# Patient Record
Sex: MALE | Race: BLACK OR AFRICAN AMERICAN | Employment: PART TIME | URBAN - NONMETROPOLITAN AREA
[De-identification: names, ages, dates, MRNs, and addresses within clinical notes are randomized per-mention and may not be internally consistent; named-entity substitution may affect disease eponyms.]

---
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
## 2020-03-20 ENCOUNTER — HOSPITAL ENCOUNTER (EMERGENCY)
Age: 20
Discharge: HOME | End: 2020-03-20
Payer: MEDICARE

## 2020-03-20 VITALS
HEART RATE: 78 BPM | SYSTOLIC BLOOD PRESSURE: 120 MMHG | WEIGHT: 134 LBS | OXYGEN SATURATION: 98 % | RESPIRATION RATE: 16 BRPM | TEMPERATURE: 97.5 F | DIASTOLIC BLOOD PRESSURE: 74 MMHG

## 2020-03-20 PROCEDURE — 99212 OFFICE O/P EST SF 10 MIN: CPT

## 2020-03-20 PROCEDURE — 99213 OFFICE O/P EST LOW 20 MIN: CPT | Performed by: NURSE PRACTITIONER

## 2020-03-20 RX ORDER — IBUPROFEN 200 MG
200 TABLET ORAL EVERY 6 HOURS PRN
COMMUNITY

## 2020-03-20 ASSESSMENT — ENCOUNTER SYMPTOMS
ABDOMINAL PAIN: 0
EYE ITCHING: 0
TROUBLE SWALLOWING: 0
VOMITING: 1
ALLERGIC/IMMUNOLOGIC NEGATIVE: 1
EYE REDNESS: 0
ANAL BLEEDING: 0
ABDOMINAL DISTENTION: 0
SHORTNESS OF BREATH: 0
RECTAL PAIN: 0
BACK PAIN: 0
WHEEZING: 0
RHINORRHEA: 0
DIARRHEA: 0
BLOOD IN STOOL: 0
NAUSEA: 0
SORE THROAT: 0
STRIDOR: 0
CONSTIPATION: 0
VOICE CHANGE: 0
COUGH: 0

## 2020-03-20 NOTE — ED TRIAGE NOTES
Pt ambulatory into esuc with c/o needing a return to work note. Pt states he was sent home from work yesterday for not feeling good. Pt denies pain. Pt states he thinks it was from something he ate.

## 2020-03-20 NOTE — ED NOTES
All discharge education and information given. Pt instructed to go to ED for any shortness of breath, chest pain or Abd pain. Verbalized Understanding. Left stable.      Randolph Pena RN  03/20/20 1707

## 2020-03-20 NOTE — ED PROVIDER NOTES
Neurological: Negative for dizziness, weakness, light-headedness and headaches. Hematological: Negative for adenopathy. Psychiatric/Behavioral: The patient is not nervous/anxious. PAST MEDICAL HISTORY         Diagnosis Date    Diabetes mellitus [facility])        SURGICAL HISTORY     Patient  has no past surgical history on file. CURRENT MEDICATIONS       Discharge Medication List as of 3/20/2020  1:42 PM      CONTINUE these medications which have NOT CHANGED    Details   ibuprofen (ADVIL;MOTRIN) 200 MG tablet Take 200 mg by mouth every 6 hours as needed for PainHistorical Med      acetaminophen (TYLENOL) 325 MG tablet Take 650 mg by mouth every 6 hours as needed for PainHistorical Med             ALLERGIES     Patient is is allergic to amoxicillin. FAMILY HISTORY     Patient'sfamily history is not on file. SOCIAL HISTORY     Patient  reports that he has never smoked. He has never used smokeless tobacco. He reports that he does not drink alcohol or use drugs. PHYSICAL EXAM     ED TRIAGE VITALS  BP: 120/74, Temp: 97.5 °F (36.4 °C), Heart Rate: 78, Resp: 16, SpO2: 98 %  Physical Exam  Vitals signs and nursing note reviewed. Constitutional:       General: He is not in acute distress. Appearance: Normal appearance. He is well-developed, well-groomed and normal weight. He is not ill-appearing, toxic-appearing or diaphoretic. HENT:      Head: Normocephalic and atraumatic. Right Ear: Hearing, tympanic membrane, ear canal and external ear normal.      Left Ear: Hearing, tympanic membrane, ear canal and external ear normal.      Nose: Nose normal. No congestion or rhinorrhea. Mouth/Throat:      Lips: Pink. No lesions. Mouth: Mucous membranes are moist. No oral lesions. Tongue: No lesions. Palate: No lesions. Pharynx: Oropharynx is clear. Uvula midline. No pharyngeal swelling, oropharyngeal exudate, posterior oropharyngeal erythema or uvula swelling.       Tonsils: No tonsillar exudate or tonsillar abscesses. 2+ on the right. 2+ on the left. Eyes:      General: Lids are normal.         Right eye: No discharge. Left eye: No discharge. Conjunctiva/sclera: Conjunctivae normal.      Right eye: Right conjunctiva is not injected. Left eye: Left conjunctiva is not injected. Pupils: Pupils are equal.   Neck:      Musculoskeletal: Normal range of motion. Cardiovascular:      Rate and Rhythm: Normal rate and regular rhythm. Pulses: Normal pulses. Pulmonary:      Effort: Pulmonary effort is normal. No respiratory distress. Breath sounds: Normal breath sounds and air entry. No stridor, decreased air movement or transmitted upper airway sounds. No decreased breath sounds, wheezing, rhonchi or rales. Abdominal:      General: Abdomen is flat. Bowel sounds are normal.      Palpations: Abdomen is soft. Tenderness: There is no right CVA tenderness, left CVA tenderness, guarding or rebound. Musculoskeletal:      Right knee: He exhibits normal range of motion. Left knee: He exhibits normal range of motion. Lymphadenopathy:      Cervical: No cervical adenopathy. Skin:     General: Skin is warm and dry. Capillary Refill: Capillary refill takes less than 2 seconds. Coloration: Skin is not pale. Findings: No rash. Comments: No obvious signs of infection or trauma. Neurological:      Mental Status: He is alert and oriented to person, place, and time. Sensory: No sensory deficit. Psychiatric:         Behavior: Behavior normal. Behavior is cooperative. DIAGNOSTIC RESULTS   Labs:No results found for this visit on 03/20/20.     IMAGING:  No orders to display     URGENT CARE COURSE:     Vitals:    03/20/20 1329 03/20/20 1331   BP:  120/74   Pulse:  78   Resp:  16   Temp:  97.5 °F (36.4 °C)   TempSrc:  Temporal   SpO2:  98%   Weight: 134 lb (60.8 kg)        Medications - No data to display  PROCEDURES:  Cher Frederick AFl

## 2021-12-31 ENCOUNTER — HOSPITAL ENCOUNTER (EMERGENCY)
Age: 21
Discharge: HOME | End: 2021-12-31
Payer: MEDICARE

## 2021-12-31 VITALS
TEMPERATURE: 97.5 F | WEIGHT: 139 LBS | OXYGEN SATURATION: 97 % | SYSTOLIC BLOOD PRESSURE: 132 MMHG | HEIGHT: 62 IN | BODY MASS INDEX: 25.58 KG/M2 | RESPIRATION RATE: 14 BRPM | DIASTOLIC BLOOD PRESSURE: 68 MMHG | HEART RATE: 90 BPM

## 2021-12-31 DIAGNOSIS — S61.212A LACERATION OF RIGHT MIDDLE FINGER W/O FOREIGN BODY W/O DAMAGE TO NAIL, INITIAL ENCOUNTER: ICD-10-CM

## 2021-12-31 DIAGNOSIS — S61.214A LACERATION OF RIGHT RING FINGER W/O FOREIGN BODY W/O DAMAGE TO NAIL, INITIAL ENCOUNTER: ICD-10-CM

## 2021-12-31 DIAGNOSIS — Z48.02 ENCOUNTER FOR REMOVAL OF SUTURES: Primary | ICD-10-CM

## 2021-12-31 PROCEDURE — 99213 OFFICE O/P EST LOW 20 MIN: CPT

## 2021-12-31 PROCEDURE — 99211 OFF/OP EST MAY X REQ PHY/QHP: CPT

## 2021-12-31 PROCEDURE — 99212 OFFICE O/P EST SF 10 MIN: CPT | Performed by: NURSE PRACTITIONER

## 2021-12-31 NOTE — ED TRIAGE NOTES
Pt ambulatory into esuc with c/o needing sutures removed from right third and fourth digits. Pt states they were placed at [facility] on 12/20/21. Pt states he was suppose to take an antibiotic but states it was not at the [facility]. Pt states he did not call to check. Pt denies pain at this time.

## 2021-12-31 NOTE — ED NOTES
Pt verbalized discharge instructions. Pt informed to go to ER if develop chest pain, shortness of breath or abdominal pain. Pt ambulatory out in stable condition. Assessment unchanged.        Park Victor RN  12/31/21 7745

## 2022-10-04 NOTE — ED PROVIDER NOTES
[place] [address]  Urgent Care Encounter    CHIEF COMPLAINT    Chief Complaint   Patient presents with    Suture / Staple Removal     to third and fourth digits of right hand        Nursing Notes, Past Medical Hx, Past Surgical Hx, Social Hx, Allergies, and Family Hx were all reviewed and agreed with, or any disagreements were addressed in the HPI. HPI    Luann White is a 24 y.o. male who presents for removal of sutures. The sutures were placed 11 days ago. Since that time the patient has noted no signs of infection no increased pain and increased redness swelling or drainage from the wound. He was carrying something and cut his finger on a piece of metal.  Patient has 7 sutures to the ring and middle finger of the right hand. Patient had sutures placed at [place] [id number]    Constitutional:  Denies fever, chills, or weakness   All other pertinent systems negative. PAST MEDICAL HISTORY         Diagnosis Date    Diabetes mellitus [facility])        SURGICAL HISTORY     Patient  has no past surgical history on file. CURRENT MEDICATIONS       Previous Medications    ACETAMINOPHEN (TYLENOL) 325 MG TABLET    Take 650 mg by mouth every 6 hours as needed for Pain    IBUPROFEN (ADVIL;MOTRIN) 200 MG TABLET    Take 200 mg by mouth every 6 hours as needed for Pain       ALLERGIES     Patient is is allergic to amoxicillin. FAMILY HISTORY     Patient's family history is not on file. SOCIAL HISTORY     Patient  reports that he has never smoked. He has never used smokeless tobacco. He reports that he does not drink alcohol and does not use drugs. PHYSICAL EXAM    VITAL SIGNS: /68   Pulse 90   Temp 97.5 °F (36.4 °C) (Temporal)   Resp 14   Ht 5' 2\" (1.575 m)   Wt 139 lb (63 kg)   SpO2 97%   BMI 25.42 kg/m²    Constitutional:  Well developed, Well nourished, No acute distress, Non-toxic appearance. Integument:  Wound appears clean dry and intact. Sutures are in place and there is no sign of surrounding infection or infection within the wound. There is minimal tenderness at this time. PROCEDURES    Suture removal: 7 Sutures were removed and the wound remained closed. Small amount of Dermabond was applied to the middle finger and the wound was left open to air. ED COURSE & MEDICAL DECISION MAKING  Patient presents for suture removal. The wound is well healed without signs of infection. The sutures are removed. Wound care and activity instructions given. Return PRN. FINAL IMPRESSION    1. Encounter for suture removal  2. Laceration of right middle finger initial encounter  3. Laceration of right ring finger initial encounter      DISPOSITION/PLAN   DISPOSITION Decision To Discharge 12/31/2021 09:23:27 AM    Monitor for redness, drainage, pain   Monitor for any fevers  Keep clean and dry  Take medication as directed  Follow up with your PCP or return for any concerns   or go to the Emergency Department      PATIENT REFERRED TO:  [address] [place] [address],[address]  [facility]. [address] [facility] [address]  Schedule an appointment as soon as possible for a visit in 1 week  For recheck and further evaluation, As needed    DISCHARGE MEDICATIONS:  New Prescriptions    No medications on file     Current Discharge Medication List          Alroy Angelucci, [address] [address], APRN - CNP  12/31/21 1649 Continue medications as directed  Monitor BP

## 2023-08-04 ENCOUNTER — HOSPITAL ENCOUNTER (EMERGENCY)
Age: 23
Discharge: HOME | End: 2023-08-04
Payer: MEDICAID

## 2023-08-04 VITALS
SYSTOLIC BLOOD PRESSURE: 126 MMHG | RESPIRATION RATE: 16 BRPM | OXYGEN SATURATION: 96 % | TEMPERATURE: 98.8 F | DIASTOLIC BLOOD PRESSURE: 63 MMHG | WEIGHT: 132 LBS | BODY MASS INDEX: 24.14 KG/M2 | HEART RATE: 111 BPM

## 2023-08-04 DIAGNOSIS — L73.9 FOLLICULITIS: Primary | ICD-10-CM

## 2023-08-04 DIAGNOSIS — L02.411 ABSCESS OF AXILLA, RIGHT: ICD-10-CM

## 2023-08-04 PROCEDURE — 99213 OFFICE O/P EST LOW 20 MIN: CPT | Performed by: EMERGENCY MEDICINE

## 2023-08-04 PROCEDURE — 99213 OFFICE O/P EST LOW 20 MIN: CPT

## 2023-08-04 RX ORDER — CEPHALEXIN 500 MG/1
500 CAPSULE ORAL 3 TIMES DAILY
Qty: 21 CAPSULE | Refills: 0 | Status: SHIPPED | OUTPATIENT
Start: 2023-08-04 | End: 2023-08-11

## 2023-08-04 ASSESSMENT — PAIN - FUNCTIONAL ASSESSMENT: PAIN_FUNCTIONAL_ASSESSMENT: NONE - DENIES PAIN

## 2023-08-04 NOTE — ED TRIAGE NOTES
Patient c/o of lump rt. Armpit x one month. Swollen bump rt. Pubic margaret one wk. Using hot compresses.

## 2023-08-04 NOTE — DISCHARGE INSTRUCTIONS
Keflex as directed until gone    Warm compresses to the axilla and to the pubic area for treatment    You may apply a thin layer of antibiotic ointment to the pubic area    Follow-up with your family physician or return here if no improvement of symptoms in 3 days.   Sooner if worse

## 2023-09-18 ENCOUNTER — HOSPITAL ENCOUNTER (EMERGENCY)
Age: 23
Discharge: HOME | End: 2023-09-18
Payer: MEDICAID

## 2023-09-18 VITALS
SYSTOLIC BLOOD PRESSURE: 137 MMHG | OXYGEN SATURATION: 99 % | HEIGHT: 64 IN | RESPIRATION RATE: 18 BRPM | TEMPERATURE: 98.1 F | BODY MASS INDEX: 22.2 KG/M2 | WEIGHT: 130 LBS | HEART RATE: 85 BPM | DIASTOLIC BLOOD PRESSURE: 73 MMHG

## 2023-09-18 DIAGNOSIS — Z11.3 SCREEN FOR STD (SEXUALLY TRANSMITTED DISEASE): Primary | ICD-10-CM

## 2023-09-18 LAB
BILIRUB UR STRIP.AUTO-MCNC: NEGATIVE MG/DL
CHARACTER UR: CLEAR
COLOR: YELLOW
GLUCOSE UR QL STRIP.AUTO: NEGATIVE MG/DL
KETONES UR QL STRIP.AUTO: NEGATIVE
NITRITE UR QL STRIP.AUTO: NEGATIVE
PH UR STRIP.AUTO: 5.5 [PH] (ref 5–9)
PROT UR STRIP.AUTO-MCNC: NEGATIVE MG/DL
RBC #/AREA URNS HPF: NEGATIVE /[HPF]
SP GR UR STRIP.AUTO: >= 1.03 (ref 1–1.03)
UROBILINOGEN, URINE: 0.2 EU/DL (ref 0.2–1)
WBC #/AREA URNS HPF: NEGATIVE /[HPF]

## 2023-09-18 PROCEDURE — 87491 CHLMYD TRACH DNA AMP PROBE: CPT

## 2023-09-18 PROCEDURE — 87591 N.GONORRHOEAE DNA AMP PROB: CPT

## 2023-09-18 PROCEDURE — 99213 OFFICE O/P EST LOW 20 MIN: CPT

## 2023-09-18 PROCEDURE — 81003 URINALYSIS AUTO W/O SCOPE: CPT

## 2023-09-18 PROCEDURE — 99212 OFFICE O/P EST SF 10 MIN: CPT | Performed by: NURSE PRACTITIONER

## 2023-09-18 ASSESSMENT — ENCOUNTER SYMPTOMS
STRIDOR: 0
COUGH: 0
CHOKING: 0
SHORTNESS OF BREATH: 0
CHEST TIGHTNESS: 0
APNEA: 0
WHEEZING: 0
ABDOMINAL PAIN: 0

## 2023-09-18 ASSESSMENT — PAIN - FUNCTIONAL ASSESSMENT: PAIN_FUNCTIONAL_ASSESSMENT: NONE - DENIES PAIN

## 2023-09-18 NOTE — ED PROVIDER NOTES
[address] [facility]  Urgent Care Encounter      CHIEF COMPLAINT       Chief Complaint   Patient presents with    Dysuria       Nurses Notes reviewed and I agree except as noted in the HPI. HISTORY OFPRESENT ILLNESS   Grace Gil is a 21 y.o. The history is provided by the patient. No  was used. Sexually Transmitted Diseases  This is a new problem. The current episode started 3 to 5 hours ago. The problem occurs rarely. The problem has been resolved. Pertinent negatives include no chest pain, no abdominal pain, no headaches and no shortness of breath. Nothing aggravates the symptoms. Nothing relieves the symptoms. He has tried nothing for the symptoms. The treatment provided no relief. REVIEW OF SYSTEMS     Review of Systems   Constitutional:  Negative for activity change, appetite change, chills, diaphoresis, fatigue, fever and unexpected weight change. Respiratory:  Negative for apnea, cough, choking, chest tightness, shortness of breath, wheezing and stridor. Cardiovascular:  Negative for chest pain, palpitations and leg swelling. Gastrointestinal:  Negative for abdominal pain. Genitourinary:  Positive for dysuria. Negative for decreased urine volume, difficulty urinating, enuresis, flank pain, frequency, genital sores, hematuria, penile discharge, penile pain, penile swelling, scrotal swelling, testicular pain and urgency. Neurological:  Negative for headaches. PAST MEDICAL HISTORY         Diagnosis Date    Diabetes mellitus [facility])        SURGICAL HISTORY     Patient  has no past surgical history on file. CURRENT MEDICATIONS       Discharge Medication List as of 9/18/2023  7:22 PM          ALLERGIES     Patient is is allergic to amoxicillin. FAMILY HISTORY     Patient's family history includes No Known Problems in his father and mother. SOCIAL HISTORY     Patient  reports that he has never smoked.  He has never used smokeless tobacco. He

## 2023-09-19 LAB
CHLAMYDIA TRACHOMATIS BY RT-PCR: NOT DETECTED
CT/NG SOURCE: NORMAL
NEISSERIA GONORRHOEAE BY RT-PCR: NOT DETECTED

## 2024-02-21 ENCOUNTER — APPOINTMENT (OUTPATIENT)
Dept: GENERAL RADIOLOGY | Age: 24
End: 2024-02-21
Payer: MEDICAID

## 2024-02-21 ENCOUNTER — HOSPITAL ENCOUNTER (EMERGENCY)
Age: 24
Discharge: HOME | End: 2024-02-21
Payer: MEDICAID

## 2024-02-21 VITALS
HEART RATE: 74 BPM | BODY MASS INDEX: 22.49 KG/M2 | RESPIRATION RATE: 16 BRPM | WEIGHT: 135 LBS | OXYGEN SATURATION: 100 % | SYSTOLIC BLOOD PRESSURE: 133 MMHG | HEIGHT: 65 IN | TEMPERATURE: 97.9 F | DIASTOLIC BLOOD PRESSURE: 73 MMHG

## 2024-02-21 DIAGNOSIS — M25.572 ACUTE LEFT ANKLE PAIN: Primary | ICD-10-CM

## 2024-02-21 PROCEDURE — 99213 OFFICE O/P EST LOW 20 MIN: CPT

## 2024-02-21 PROCEDURE — 73610 X-RAY EXAM OF ANKLE: CPT

## 2024-02-21 ASSESSMENT — PAIN DESCRIPTION - LOCATION: LOCATION: ANKLE

## 2024-02-21 ASSESSMENT — PAIN DESCRIPTION - DESCRIPTORS: DESCRIPTORS: DISCOMFORT

## 2024-02-21 ASSESSMENT — PAIN - FUNCTIONAL ASSESSMENT
PAIN_FUNCTIONAL_ASSESSMENT: PREVENTS OR INTERFERES SOME ACTIVE ACTIVITIES AND ADLS
PAIN_FUNCTIONAL_ASSESSMENT: 0-10

## 2024-02-21 ASSESSMENT — PAIN DESCRIPTION - PAIN TYPE: TYPE: ACUTE PAIN

## 2024-02-21 ASSESSMENT — PAIN DESCRIPTION - FREQUENCY: FREQUENCY: CONTINUOUS

## 2024-02-21 ASSESSMENT — PAIN SCALES - GENERAL: PAINLEVEL_OUTOF10: 10

## 2024-02-21 ASSESSMENT — PAIN DESCRIPTION - ORIENTATION: ORIENTATION: LEFT

## 2024-02-21 NOTE — ED NOTES
Patient refused wheelchair to room or xray and stated he will use his crutch.     Darian Schroeder RN  02/21/24 0660

## 2024-02-21 NOTE — ED PROVIDER NOTES
[facility] URGENT CARE  Urgent Care Encounter       CHIEF COMPLAINT       Chief Complaint   Patient presents with    Ankle Injury     Rolled left ankle yesterday       Nurses Notes reviewed and I agree except as noted in the HPI.  HISTORY OF PRESENT ILLNESS   August Nj is a 23 y.o. male who presents with concerns of left ankle pain. Reports was at a sports practice yesterday preforming running drills and ankle moved/rolled wrong. Reports pain radiated from ankle down into top of foot and occasionally up left leg. Reports no use of medication for pain relief, reports iced ankle once. Patient did ambulate into urgent care with one crutch.     HPI    REVIEW OF SYSTEMS     Review of Systems   Musculoskeletal:  Positive for joint swelling (left ankle).        Left ankle pain   Skin:  Negative for wound.   All other systems reviewed and are negative.      PAST MEDICAL HISTORY         Diagnosis Date    Diabetes mellitus ([facility])        SURGICALHISTORY     Patient  has no past surgical history on file.    CURRENT MEDICATIONS       Previous Medications    No medications on file       ALLERGIES     Patient is is allergic to amoxicillin.    Patients   There is no immunization history on file for this patient.    FAMILY HISTORY     Patient's family history includes No Known Problems in his father and mother.    SOCIAL HISTORY     Patient  reports that he has never smoked. He has never used smokeless tobacco. He reports current alcohol use. He reports that he does not currently use drugs after having used the following drugs: Marijuana (Weed).    PHYSICAL EXAM     ED TRIAGE VITALS  BP: 133/73, Temp: 97.9 °F (36.6 °C), Pulse: 74, Respirations: 16, SpO2: 100 %,Estimated body mass index is 22.47 kg/m² as calculated from the following:    Height as of this encounter: 1.651 m (5' 5\").    Weight as of this encounter: 61.2 kg (135 lb).,No LMP for male patient.    Physical Exam  Vitals and nursing note reviewed.  (135 lb)   Height: 1.651 m (5' 5\")       Medications - No data to display         PROCEDURES:  None    FINAL IMPRESSION      1. Acute left ankle pain          DISPOSITION/ PLAN     Patient seen and evaluated for the above. Discussed xray results with patient and mother, aware no fracture, dislocation, or abnormalities seen. Left ankle brace supplies and applies at urgent care. Instructed on brace usage, to use Tylenol and Ibuprofen for pain management, to rest and ice ankle. Patient and mother verbalized understanding. Patient discharged home in stable condition and free from any distress.       PATIENT REFERRED TO:  No primary care provider on file.  No primary physician on file.      DISCHARGE MEDICATIONS:  New Prescriptions    No medications on file       Discontinued Medications    No medications on file       There are no discharge medications for this patient.      MO Khan CNP    (Please note that portions of this note were completed with a voice recognition program. Efforts were made to edit the dictations but occasionally words are mis-transcribed.)            Karina Carrion APRN - CNP  02/21/24 1045

## 2024-02-21 NOTE — DISCHARGE INSTRUCTIONS
Rest, ice, use of brace.   Tylenol / Ibuprofen as needed for fever and or pain.  Follow up with PCP or [facility] in 3-5 days if no improvement or sooner with worsening symptoms.

## 2024-02-21 NOTE — ED TRIAGE NOTES
Patient ambulated to room with 1 crutch and complaint of left ankle and foot pain. States he was at sport practice and was doing run drills and hurt his ankle by moving wrong. States pain is a 10 and radiates from left ankle down top of foot and occasionally up left lower leg.

## 2024-03-20 ENCOUNTER — APPOINTMENT (OUTPATIENT)
Dept: GENERAL RADIOLOGY | Age: 24
End: 2024-03-20
Payer: MEDICAID

## 2024-03-20 ENCOUNTER — HOSPITAL ENCOUNTER (EMERGENCY)
Age: 24
Discharge: HOME | End: 2024-03-20
Payer: MEDICAID

## 2024-03-20 VITALS
OXYGEN SATURATION: 97 % | DIASTOLIC BLOOD PRESSURE: 79 MMHG | SYSTOLIC BLOOD PRESSURE: 148 MMHG | TEMPERATURE: 98.1 F | HEART RATE: 86 BPM | WEIGHT: 135 LBS | BODY MASS INDEX: 22.49 KG/M2 | HEIGHT: 65 IN | RESPIRATION RATE: 18 BRPM

## 2024-03-20 DIAGNOSIS — R29.898 JAW CLICKING: Primary | ICD-10-CM

## 2024-03-20 DIAGNOSIS — S09.93XA FACIAL INJURY, INITIAL ENCOUNTER: ICD-10-CM
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
## 2024-03-20 DIAGNOSIS — R68.84 JAW PAIN: ICD-10-CM

## 2024-03-20 PROCEDURE — 70110 X-RAY EXAM OF JAW 4/> VIEWS: CPT

## 2024-03-20 PROCEDURE — 99213 OFFICE O/P EST LOW 20 MIN: CPT

## 2024-03-20 RX ORDER — ACETAMINOPHEN 500 MG
500 TABLET ORAL 4 TIMES DAILY PRN
Qty: 120 TABLET | Refills: 0 | Status: SHIPPED | OUTPATIENT
Start: 2024-03-20

## 2024-03-20 RX ORDER — KETOROLAC TROMETHAMINE 10 MG/1
10 TABLET, FILM COATED ORAL EVERY 6 HOURS PRN
Qty: 20 TABLET | Refills: 0 | Status: SHIPPED | OUTPATIENT
Start: 2024-03-20

## 2024-03-20 ASSESSMENT — PAIN DESCRIPTION - DESCRIPTORS: DESCRIPTORS: DISCOMFORT

## 2024-03-20 ASSESSMENT — PAIN DESCRIPTION - LOCATION: LOCATION: JAW

## 2024-03-20 ASSESSMENT — PAIN DESCRIPTION - ORIENTATION: ORIENTATION: LEFT;UPPER

## 2024-03-20 ASSESSMENT — PAIN - FUNCTIONAL ASSESSMENT: PAIN_FUNCTIONAL_ASSESSMENT: 0-10

## 2024-03-20 ASSESSMENT — PAIN SCALES - GENERAL: PAINLEVEL_OUTOF10: 8
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
## 2024-03-20 NOTE — DISCHARGE INSTRUCTIONS
Return to ER for reinjury to area or failure of symptoms to improve.  Return for inability to tolerate food/fluid.  Return for uncontrolled pain.  Return for shortness of breath or any other urgent/emergent medical concerns.    Please optimize hydration, okay to treat pain with Toradol/Tylenol in alternating fashion as prescribed.  Do not use Motrin/ibuprofen/Advil while you are taking Toradol, they are similar medications and will increase incidence of side effects/overdose.    If symptoms fail to improve over the next 3-4 days please return to urgent care or ER for ongoing evaluation/reevaluation.    Hope you are feeling better soon and stay safe out there!
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
## 2024-05-28 ENCOUNTER — APPOINTMENT (OUTPATIENT)
Dept: GENERAL RADIOLOGY | Age: 24
End: 2024-05-28
Payer: MEDICAID

## 2024-05-28 ENCOUNTER — HOSPITAL ENCOUNTER (EMERGENCY)
Age: 24
Discharge: HOME | End: 2024-05-28
Payer: MEDICAID

## 2024-05-28 VITALS
BODY MASS INDEX: 22.49 KG/M2 | OXYGEN SATURATION: 98 % | TEMPERATURE: 98 F | DIASTOLIC BLOOD PRESSURE: 69 MMHG | HEIGHT: 65 IN | SYSTOLIC BLOOD PRESSURE: 149 MMHG | RESPIRATION RATE: 16 BRPM | WEIGHT: 135 LBS | HEART RATE: 74 BPM

## 2024-05-28 DIAGNOSIS — S63.615A SPRAIN OF LEFT RING FINGER, UNSPECIFIED SITE OF DIGIT, INITIAL ENCOUNTER: Primary | ICD-10-CM

## 2024-05-28 PROCEDURE — 99213 OFFICE O/P EST LOW 20 MIN: CPT

## 2024-05-28 PROCEDURE — 73130 X-RAY EXAM OF HAND: CPT

## 2024-05-28 PROCEDURE — 99213 OFFICE O/P EST LOW 20 MIN: CPT | Performed by: NURSE PRACTITIONER

## 2024-05-28 RX ORDER — IBUPROFEN 800 MG/1
800 TABLET ORAL EVERY 8 HOURS PRN
Qty: 30 TABLET | Refills: 0 | Status: SHIPPED | OUTPATIENT
Start: 2024-05-28

## 2024-05-28 ASSESSMENT — PAIN DESCRIPTION - FREQUENCY: FREQUENCY: CONTINUOUS

## 2024-05-28 ASSESSMENT — PAIN DESCRIPTION - ORIENTATION: ORIENTATION: LEFT

## 2024-05-28 ASSESSMENT — PAIN DESCRIPTION - LOCATION: LOCATION: FINGER (COMMENT WHICH ONE)

## 2024-05-28 ASSESSMENT — PAIN DESCRIPTION - PAIN TYPE: TYPE: ACUTE PAIN

## 2024-05-28 ASSESSMENT — PAIN - FUNCTIONAL ASSESSMENT: PAIN_FUNCTIONAL_ASSESSMENT: 0-10

## 2024-05-28 ASSESSMENT — PAIN DESCRIPTION - ONSET: ONSET: SUDDEN

## 2024-05-28 ASSESSMENT — ENCOUNTER SYMPTOMS
SHORTNESS OF BREATH: 0
COUGH: 0

## 2024-05-28 ASSESSMENT — PAIN SCALES - GENERAL: PAINLEVEL_OUTOF10: 10

## 2024-05-28 NOTE — ED PROVIDER NOTES
[facility] URGENT CARE  UrgentCare Encounter      CHIEFCOMPLAINT       Chief Complaint   Patient presents with    Finger Injury     Left ring       Nurses Notes reviewed and I agree except as noted in the HPI.  HISTORY OF PRESENT ILLNESS     August Nj is a 23 y.o. male who presents to the urgent care for evaluation of left ring finger pain after injuring it on a punching bag last week. Cannot bend the tip of finger.     The patient/patient representative has no other acute complaints at this time.    REVIEW OF SYSTEMS     Review of Systems   Constitutional:  Negative for chills and fever.   Respiratory:  Negative for cough and shortness of breath.    Cardiovascular:  Negative for chest pain.   Musculoskeletal:  Positive for arthralgias.       PAST MEDICAL HISTORY         Diagnosis Date    Diabetes mellitus (HCC)        SURGICAL HISTORY     Patient  has no past surgical history on file.    CURRENT MEDICATIONS       Discharge Medication List as of 5/28/2024  1:56 PM          ALLERGIES     Patient is is allergic to amoxicillin.    FAMILY HISTORY     Patient'sfamily history includes No Known Problems in his father and mother.    SOCIAL HISTORY     Patient  reports that he has never smoked. He has never used smokeless tobacco. He reports current alcohol use. He reports that he does not currently use drugs after having used the following drugs: Marijuana (Weed).    PHYSICAL EXAM     ED TRIAGE VITALS  BP: (!) 149/69, Temp: 98 °F (36.7 °C), Pulse: 74, Respirations: 16, SpO2: 98 %  Physical Exam  Vitals and nursing note reviewed.   Constitutional:       General: He is not in acute distress.     Appearance: Normal appearance. He is well-developed and well-groomed.   HENT:      Head: Normocephalic and atraumatic.      Mouth/Throat:      Lips: Pink.      Mouth: Mucous membranes are moist.   Cardiovascular:      Rate and Rhythm: Normal rate.   Pulmonary:      Effort: Pulmonary effort is normal. No respiratory  and verbal instructions about care at home, including over-the-counter management, prescription information, follow-up, and signs and symptoms of worsening of condition, and the patient/patient representative did verbalize understanding of these instructions.  Patient will go to nearest emergency department if symptoms change or worsen, or for any sign or symptom deemed emergent by the patient or family members. Follow up as an outpatient with PCP within the next 3 days, or sooner if symptoms warrant.       PATIENT REFERRED TO:  [facility]  [address] [place] [address]  [phone]  Schedule an appointment as soon as possible for a visit in 3 days  if you do not have a family provider please call to establish care, if symptoms change or worsen please go to the nearest emergency room      MO Hooks - CNP    Please note that some or all of this chart was generated using [employer] voice recognition software. Although every effort was made to ensure the accuracy of this automated transcription, some errors in transcription may have occurred.         [place], [place] - CNP  05/28/24 8801

## 2025-04-08 ENCOUNTER — APPOINTMENT (OUTPATIENT)
Dept: GENERAL RADIOLOGY | Age: 25
End: 2025-04-08
Payer: MEDICAID

## 2025-04-08 ENCOUNTER — HOSPITAL ENCOUNTER (EMERGENCY)
Age: 25
Discharge: HOME | End: 2025-04-08
Payer: MEDICAID

## 2025-04-08 VITALS
DIASTOLIC BLOOD PRESSURE: 78 MMHG | TEMPERATURE: 97.7 F | HEART RATE: 89 BPM | OXYGEN SATURATION: 97 % | SYSTOLIC BLOOD PRESSURE: 138 MMHG

## 2025-04-08 DIAGNOSIS — S81.811A LACERATION OF RIGHT LOWER EXTREMITY, INITIAL ENCOUNTER: Primary | ICD-10-CM

## 2025-04-08 PROCEDURE — 99284 EMERGENCY DEPT VISIT MOD MDM: CPT

## 2025-04-08 PROCEDURE — 6370000000 HC RX 637 (ALT 250 FOR IP): Performed by: NURSE PRACTITIONER

## 2025-04-08 PROCEDURE — 6360000002 HC RX W HCPCS: Performed by: NURSE PRACTITIONER

## 2025-04-08 PROCEDURE — 73600 X-RAY EXAM OF ANKLE: CPT

## 2025-04-08 PROCEDURE — 12032 INTMD RPR S/A/T/EXT 2.6-7.5: CPT

## 2025-04-08 PROCEDURE — 90715 TDAP VACCINE 7 YRS/> IM: CPT | Performed by: NURSE PRACTITIONER

## 2025-04-08 PROCEDURE — 90471 IMMUNIZATION ADMIN: CPT | Performed by: NURSE PRACTITIONER

## 2025-04-08 RX ORDER — LIDOCAINE HYDROCHLORIDE 20 MG/ML
15 SOLUTION OROPHARYNGEAL ONCE
Status: COMPLETED | OUTPATIENT
Start: 2025-04-08 | End: 2025-04-08

## 2025-04-08 RX ADMIN — TETANUS TOXOID, REDUCED DIPHTHERIA TOXOID AND ACELLULAR PERTUSSIS VACCINE, ADSORBED 0.5 ML: 5; 2.5; 8; 8; 2.5 SUSPENSION INTRAMUSCULAR at 11:19

## 2025-04-08 RX ADMIN — Medication 3 ML: at 11:20

## 2025-04-08 RX ADMIN — LIDOCAINE HYDROCHLORIDE 15 ML: 20 SOLUTION ORAL at 11:20

## 2025-04-08 NOTE — ED TRIAGE NOTES
Pt to ED with laceration to right lower leg. Pt kicked a window this am. Laceration has wide base, edges unable to be approximated. No active bleeding noted. Is in stable condition

## 2025-04-08 NOTE — ED PROVIDER NOTES
foreign bodies/material noted, no muscle damage noted, no nerve damage noted, no tendon damage noted, no underlying fracture noted and no vascular damage noted      Contaminated: yes    Treatment:     Area cleansed with:  Shur-Clens, saline and chlorhexidine    Amount of cleaning:  Standard    Irrigation solution:  Sterile saline    Irrigation volume:  40ml    Irrigation method:  Syringe    Visualized foreign bodies/material removed: no      Debridement:  None    Undermining:  None    Scar revision: no      Layers/structures repaired:  Deep dermal/superficial fascia  Deep dermal/superficial fascia:     Suture size:  3-0    Suture material:  Vicryl    Suture technique:  Horizontal mattress    Number of sutures:  1  Skin repair:     Repair method:  Sutures    Suture size:  3-0    Suture material:  Nylon    Suture technique:  Simple interrupted    Number of sutures:  3  Approximation:     Approximation:  Close  Repair type:     Repair type:  Intermediate  Post-procedure details:     Procedure completion:  Tolerated well, no immediate complications  Comments:      Laceration repair completed by Kaiden OSMAN under direct supervision of RONALDO Rhoades  :     CRITICAL CARE: (None if blank)      DISCHARGE PRESCRIPTIONS: (None if blank)  Discharge Medication List as of 4/8/2025 12:22 PM          FINAL IMPRESSION      1. Laceration of right lower extremity, initial encounter          DISPOSITION/PLAN   DISPOSITION Decision To Discharge 04/08/2025 12:20:59 PM   DISPOSITION CONDITION Stable           OUTPATIENT FOLLOW UP THE PATIENT:  [facility]  [address] [place] [address]  [phone]  Schedule an appointment as soon as possible for a visit in 3 days  For wound re-check      MO Pena CNP, Kristy J, APRN - CNP  04/08/25 4239

## 2025-04-15 ENCOUNTER — HOSPITAL ENCOUNTER (EMERGENCY)
Age: 25
Discharge: HOME | End: 2025-04-15
Payer: MEDICAID

## 2025-04-15 VITALS
OXYGEN SATURATION: 93 % | BODY MASS INDEX: 22.49 KG/M2 | SYSTOLIC BLOOD PRESSURE: 125 MMHG | RESPIRATION RATE: 16 BRPM | TEMPERATURE: 98 F | HEIGHT: 65 IN | DIASTOLIC BLOOD PRESSURE: 87 MMHG | WEIGHT: 135 LBS | HEART RATE: 63 BPM

## 2025-04-15 DIAGNOSIS — Z51.89 VISIT FOR WOUND CHECK: ICD-10-CM

## 2025-04-15 DIAGNOSIS — Z48.02 ENCOUNTER FOR REMOVAL OF SUTURES: Primary | ICD-10-CM

## 2025-04-15 PROCEDURE — 99282 EMERGENCY DEPT VISIT SF MDM: CPT

## 2025-04-15 ASSESSMENT — PAIN - FUNCTIONAL ASSESSMENT: PAIN_FUNCTIONAL_ASSESSMENT: NONE - DENIES PAIN

## 2025-04-15 NOTE — ED TRIAGE NOTES
Pt presents to ED for suture removal. Pt states he had stitches placed in right lower leg 7 days ago after kicking a mirror.

## 2025-04-18 ENCOUNTER — HOSPITAL ENCOUNTER (EMERGENCY)
Age: 25
Discharge: HOME | End: 2025-04-18
Payer: MEDICAID

## 2025-04-18 VITALS
WEIGHT: 140 LBS | DIASTOLIC BLOOD PRESSURE: 62 MMHG | SYSTOLIC BLOOD PRESSURE: 127 MMHG | RESPIRATION RATE: 18 BRPM | OXYGEN SATURATION: 99 % | HEIGHT: 63 IN | BODY MASS INDEX: 24.8 KG/M2 | TEMPERATURE: 98.2 F | HEART RATE: 78 BPM

## 2025-04-18 DIAGNOSIS — Z48.02 VISIT FOR SUTURE REMOVAL: Primary | ICD-10-CM

## 2025-04-18 PROCEDURE — 99282 EMERGENCY DEPT VISIT SF MDM: CPT

## 2025-04-18 NOTE — DISCHARGE INSTRUCTIONS
When you go outside, place sunscreen on the healing wound after the sutures have been removed for the next year to help with scarring.    PLEASE RETURN TO THE EMERGENCY DEPARTMENT IMMEDIATELY for worsening symptoms, redness around the wound or redness streaking up the body part, white drainage from the wound, or if you develop any concerning symptoms such as: high fever not relieved by acetaminophen (Tylenol) and/or ibuprofen (Motrin / Advil), chills, shortness of breath, chest pain, feeling of your heart fluttering or racing, persistent nausea and/or vomiting, vomiting up blood, blood in your stool, numbness, loss of consciousness, weakness or tingling in the arms or legs or change in color of the extremities, changes in mental status, persistent headache, blurry vision, loss of bladder / bowel control, unable to follow up with your physician, or other any other care or concern.

## 2025-04-18 NOTE — ED PROVIDER NOTES
[facility] EMERGENCY DEPARTMENT      EMERGENCY MEDICINE     Pt Name: August Nj  MRN: [id number]  Birthdate 2000  Date of evaluation: 4/18/2025  Provider: Betty Swift PA-C    CHIEF COMPLAINT       Chief Complaint   Patient presents with    Suture / Staple Removal     HISTORY OF PRESENT ILLNESS   August Nj is a pleasant 24 y.o. male who presents to the emergency department from home, as a walk in to the ED lobby for evaluation of suture removal.  Patient was seen here 10 days ago and had sutures placed in right shin.  Patient denies fever, erythema, purulent discharge, bleeding, or any complications.     PASTMEDICAL HISTORY     Past Medical History:   Diagnosis Date    Diabetes mellitus (HCC)        There is no problem list on file for this patient.    SURGICAL HISTORY     History reviewed. No pertinent surgical history.    CURRENT MEDICATIONS       Previous Medications    IBUPROFEN (ADVIL;MOTRIN) 800 MG TABLET    Take 1 tablet by mouth every 8 hours as needed for Pain       ALLERGIES     is allergic to amoxicillin.    FAMILY HISTORY     He indicated that his mother is alive. He indicated that his father is alive.       SOCIAL HISTORY       Social History     Tobacco Use    Smoking status: Never    Smokeless tobacco: Never   Vaping Use    Vaping status: Never Used   Substance Use Topics    Alcohol use: Yes     Comment: occas.    Drug use: Not Currently     Types: Marijuana (Weed)       PHYSICAL EXAM       ED Triage Vitals [04/18/25 1110]   BP Systolic BP Percentile Diastolic BP Percentile Temp Temp src Pulse Respirations SpO2   127/62 -- -- 98.2 °F (36.8 °C) -- 78 18 99 %      Height Weight - Scale         1.6 m (5' 3\") 63.5 kg (140 lb)             Additional Vital Signs:  Vitals:    04/18/25 1110   BP: 127/62   Pulse: 78   Resp: 18   Temp: 98.2 °F (36.8 °C)   SpO2: 99%     Physical Exam  Vitals and nursing note reviewed.   Constitutional:       Appearance: Normal appearance.   HENT:       Case discussed with consulting clinician:  See MDM         Shared Decision-Making was performed and disposition discussed with the        Patient/Family and questions answered NA         Social determinants of health impacting treatment or disposition:  None               Summary of Patient Presentation:      MDM  Number of Diagnoses or Management Options  Visit for suture removal  Diagnosis management comments: Patient presents to the ED with suture removal.  Wound with appropriate healing.  Laceration repair note reviewed, 3 simple interrupted sutures were placed and removed today.  Discussed strict return precautions for signs of infection.      /   Vitals Reviewed:    Vitals:    04/18/25 1110   BP: 127/62   Pulse: 78   Resp: 18   Temp: 98.2 °F (36.8 °C)   SpO2: 99%   Weight: 63.5 kg (140 lb)   Height: 1.6 m (5' 3\")       The patient was seen and examined. Appropriate diagnostic testing was performed and results reviewed with the patient.      The results of pertinent diagnostic studies and exam findings were discussed. The patient’s provisional diagnosis and plan of care were discussed with the patient and present family who expressed understanding. Any medications were reviewed and indications and risks of medications were discussed with the patient /family present. Strict verbal and written return precautions, instructions and appropriate follow-up provided to  the patient.     ED Medications administered this visit:  (None if blank)  Medications - No data to display      PROCEDURES: (None if blank)  Suture Removal    Date/Time: 4/18/2025 11:37 AM    Performed by: Betty Swift PA-C  Authorized by: Betty Swift PA-C    Consent:     Consent obtained:  Verbal    Consent given by:  Patient    Risks discussed:  Bleeding, wound separation and pain    Alternatives discussed:  Delayed treatment  Location:     Location:  Lower extremity  Procedure details:     Wound appearance:  No signs of infection, good

## 2025-04-18 NOTE — ED NOTES
Pt presents to the ED requesting to get his suture removal on R shin. Pt states they told him to come in today. Pt ambulated without any difficulties.No signs of infection noted.

## 2025-04-20 NOTE — ED PROVIDER NOTES
[facility] EMERGENCY DEPT      Pt Name: August Nj  MRN: [id number]  Birthdate 2000  Date of evaluation: 4/15/2025  Provider: Karie Corbett PA-C    CHIEF COMPLAINT       Chief Complaint   Patient presents with    Suture / Staple Removal       Nurses Notes reviewed and I agree except as noted in the HPI.      HISTORY OF PRESENT ILLNESS    August Nj is a 24 y.o. male who presents through the lobby for suture removal.  Patient was seen on 4-8 for a laceration to the right lower leg after he kicked a window.  Stitches were placed and patient has had no complications.  He presents for suture rem     PAST MEDICAL HISTORY    has a past medical history of Diabetes mellitus (HCC).    SURGICAL HISTORY      has no past surgical history on file.    CURRENT MEDICATIONS       Discharge Medication List as of 4/15/2025 11:20 AM        CONTINUE these medications which have NOT CHANGED    Details   ibuprofen (ADVIL;MOTRIN) 800 MG tablet Take 1 tablet by mouth every 8 hours as needed for Pain, Disp-30 tablet, R-0Normal             ALLERGIES     is allergic to amoxicillin.    FAMILY HISTORY     He indicated that his mother is alive. He indicated that his father is alive.   family history includes No Known Problems in his father and mother.    SOCIAL HISTORY    reports that he has never smoked. He has never used smokeless tobacco. He reports current alcohol use. He reports that he does not currently use drugs after having used the following drugs: Marijuana (Weed).    PHYSICAL EXAM     INITIAL VITALS:  height is 1.651 m (5' 5\") and weight is 61.2 kg (135 lb). His oral temperature is 98 °F (36.7 °C). His blood pressure is 125/87 and his pulse is 63. His respiration is 16 and oxygen saturation is 93%.    Physical Exam  Constitutional:       Appearance: Normal appearance. He is well-developed. He is not ill-appearing.   HENT:      Head: Normocephalic and atraumatic.      Right Ear: Hearing normal.      Left Ear:

## 2025-04-24 ENCOUNTER — HOSPITAL ENCOUNTER (EMERGENCY)
Age: 25
Discharge: HOME | End: 2025-04-24
Attending: EMERGENCY MEDICINE
Payer: MEDICAID

## 2025-04-24 VITALS
HEART RATE: 97 BPM | RESPIRATION RATE: 16 BRPM | SYSTOLIC BLOOD PRESSURE: 140 MMHG | DIASTOLIC BLOOD PRESSURE: 75 MMHG | OXYGEN SATURATION: 100 % | TEMPERATURE: 97.5 F

## 2025-04-24 DIAGNOSIS — S81.811D LACERATION OF RIGHT LOWER LEG, SUBSEQUENT ENCOUNTER: Primary | ICD-10-CM

## 2025-04-24 PROCEDURE — 99282 EMERGENCY DEPT VISIT SF MDM: CPT

## 2025-04-24 ASSESSMENT — ENCOUNTER SYMPTOMS
NAUSEA: 0
VOMITING: 0

## 2025-04-24 NOTE — ED TRIAGE NOTES
Pt to ER for evaluation of wound to RLE. Pt had stitches removed on 4/18. Pt states he hit his leg on a cart at work and the wound opened. No bleeding at this time.

## 2025-04-24 NOTE — ED PROVIDER NOTES
[facility] EMERGENCY DEPARTMENT  EMERGENCY DEPARTMENT ENCOUNTER          Pt Name: August Nj  MRN: [id number]  Birthdate 2000  Date of evaluation: 4/24/2025  Physician: Raine Varghese DO  Supervising Attending Physician: Boston Guillory DO       CHIEF COMPLAINT       Chief Complaint   Patient presents with    Wound Check         HISTORY OF PRESENT ILLNESS    HPI  August Nj is a 24 y.o. male who presents to the emergency department from work for wound check. Patient originally presented to the ED 4/8/25 with a laceration to the right distal anterior leg after kicking a window at which time the wound was sutured then removed on 4/18/25. 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 Appears more swollen to the patient with some discharge. Has been taking  ibuprofen twice daily however missed today's dose. Having some pain right now, rated 10/10 which he says has been on and off the entire time he's had this laceration. No fevers, nausea, imbalance.     The patient has no other acute complaints at this time.      REVIEW OF SYSTEMS   Review of Systems   Constitutional:  Negative for diaphoresis, fatigue and fever.   Gastrointestinal:  Negative for nausea and vomiting.   Musculoskeletal:  Positive for myalgias (R leg pain at anterior calf). Negative for arthralgias.         PAST MEDICAL AND SURGICAL HISTORY     Past Medical History:   Diagnosis Date    Diabetes mellitus (HCC)      History reviewed. No pertinent surgical history.      MEDICATIONS   No current facility-administered medications for this encounter.    Current Outpatient Medications:     ibuprofen (ADVIL;MOTRIN) 800 MG tablet, Take 1 tablet by mouth every 8 hours as needed for Pain, Disp: 30 tablet, Rfl: 0    Previous Medications    IBUPROFEN (ADVIL;MOTRIN) 800 MG TABLET    Take 1 tablet by mouth every 8 hours as needed for Pain         SOCIAL HISTORY     Social History

## 2025-05-10 ENCOUNTER — HOSPITAL ENCOUNTER (EMERGENCY)
Age: 25
Discharge: HOME | End: 2025-05-10
Payer: MEDICAID

## 2025-05-10 VITALS
WEIGHT: 135 LBS | TEMPERATURE: 97.9 F | OXYGEN SATURATION: 99 % | BODY MASS INDEX: 23.92 KG/M2 | HEART RATE: 77 BPM | SYSTOLIC BLOOD PRESSURE: 124 MMHG | HEIGHT: 63 IN | DIASTOLIC BLOOD PRESSURE: 73 MMHG | RESPIRATION RATE: 17 BRPM

## 2025-05-10 DIAGNOSIS — Z51.89 ENCOUNTER FOR WOUND RE-CHECK: Primary | ICD-10-CM

## 2025-05-10 PROCEDURE — 99283 EMERGENCY DEPT VISIT LOW MDM: CPT

## 2025-05-10 PROCEDURE — 6370000000 HC RX 637 (ALT 250 FOR IP): Performed by: PHYSICIAN ASSISTANT

## 2025-05-10 RX ORDER — CEPHALEXIN 500 MG/1
500 CAPSULE ORAL 2 TIMES DAILY
Qty: 10 CAPSULE | Refills: 0 | Status: SHIPPED | OUTPATIENT
Start: 2025-05-10 | End: 2025-05-15

## 2025-05-10 RX ADMIN — CEPHALEXIN 500 MG: 250 CAPSULE ORAL at 18:58

## 2025-05-10 ASSESSMENT — PAIN - FUNCTIONAL ASSESSMENT: PAIN_FUNCTIONAL_ASSESSMENT: 0-10

## 2025-05-10 ASSESSMENT — PAIN DESCRIPTION - LOCATION: LOCATION: LEG

## 2025-05-10 ASSESSMENT — PAIN SCALES - GENERAL: PAINLEVEL_OUTOF10: 8

## 2025-05-10 ASSESSMENT — PAIN DESCRIPTION - ORIENTATION: ORIENTATION: RIGHT

## 2025-05-10 NOTE — DISCHARGE INSTRUCTIONS
Cleanse wound in the morning and at night with soapy water.  Dry well and cover with a dry sterile dressing.  Take antibiotics as prescribed.      Alternate Tylenol and ibuprofen for pain.      You can make an appointment in 3 days with family medicine clinic for further wound rechecks.      Return to the ER for any concerns of development of infection or worsening of condition.

## 2025-05-10 NOTE — ED PROVIDER NOTES
[facility] EMERGENCY DEPT      Pt Name: August Nj  MRN: [id number]  Birthdate 2000  Date of evaluation: 5/10/2025  Provider: Karie Corbett PA-C    CHIEF COMPLAINT       Chief Complaint   Patient presents with    Wound Check       Nurses Notes reviewed and I agree except as noted in the HPI.      HISTORY OF PRESENT ILLNESS    August Nj is a 24 y.o. male who presents through the lobby for right lower extremity wound check.  On 4-8 patient \"thought [he] was James Lindsay\" and kicked a mirror.  He sustained a laceration prompting him to come to the ER where he received 3 sutures.  He presented back to the apartment on 4-15 for suture removal however sutures were not ready to be removed.  Patient presented a third time on 4-18 and had the 3 sutures removed with good appearance of the wound.  On 4-24 patient split the wound open after he hit his leg on a pallet on a cart at work.  He came to the ER and was reassured it looked well and was advised to use Neosporin and ibuprofen.  Patient presents today stating that the wound has split open more.  There is a serous fluid that drains from time to time.  It hurts mildly to walk but he is able to do so.  Patient just wanted to make sure that it was not infected.  Patient denies fever, chills, weakness, numbness, nausea, vomiting change in appetite, or other complaints.    PAST MEDICAL HISTORY    has a past medical history of Diabetes mellitus (HCC).    SURGICAL HISTORY      has no past surgical history on file.    CURRENT MEDICATIONS       Previous Medications    IBUPROFEN (ADVIL;MOTRIN) 800 MG TABLET    Take 1 tablet by mouth every 8 hours as needed for Pain       ALLERGIES     is allergic to amoxicillin.    FAMILY HISTORY     He indicated that his mother is alive. He indicated that his father is alive.   family history includes No Known Problems in his father and mother.    SOCIAL HISTORY    reports that he has never smoked. He has never used smokeless

## 2025-05-10 NOTE — ED TRIAGE NOTES
Presents to ED with concerns of an infection in his right lower leg. Pt reports on April 8th he had sutures placed on a laceration. He reports he had the sutures in for 2 weeks and then got them removed. He states on Monday he noted pain which has became worse. He states 2 days ago he noted white discoloration and drainage. PT able to ambulate to room D from ED lobby independently.

## 2025-06-05 ENCOUNTER — HOSPITAL ENCOUNTER (EMERGENCY)
Age: 25
Discharge: HOME | End: 2025-06-05
Payer: MEDICAID

## 2025-06-05 ENCOUNTER — APPOINTMENT (OUTPATIENT)
Dept: GENERAL RADIOLOGY | Age: 25
End: 2025-06-05
Payer: MEDICAID

## 2025-06-05 VITALS
SYSTOLIC BLOOD PRESSURE: 127 MMHG | DIASTOLIC BLOOD PRESSURE: 70 MMHG | RESPIRATION RATE: 16 BRPM | HEIGHT: 64 IN | TEMPERATURE: 97.9 F | OXYGEN SATURATION: 99 % | BODY MASS INDEX: 23.9 KG/M2 | HEART RATE: 76 BPM | WEIGHT: 140 LBS

## 2025-06-05 DIAGNOSIS — B34.9 VIRAL ILLNESS: Primary | ICD-10-CM

## 2025-06-05 LAB
FLUAV RNA RESP QL NAA+PROBE: NOT DETECTED
FLUBV RNA RESP QL NAA+PROBE: NOT DETECTED
S PYO AG THROAT QL: NEGATIVE
S PYO THROAT QL CULT: NORMAL
SARS-COV-2 RNA RESP QL NAA+PROBE: NOT DETECTED

## 2025-06-05 PROCEDURE — 87880 STREP A ASSAY W/OPTIC: CPT

## 2025-06-05 PROCEDURE — 99284 EMERGENCY DEPT VISIT MOD MDM: CPT

## 2025-06-05 PROCEDURE — 87070 CULTURE OTHR SPECIMN AEROBIC: CPT

## 2025-06-05 PROCEDURE — 87636 SARSCOV2 & INF A&B AMP PRB: CPT

## 2025-06-05 PROCEDURE — 71046 X-RAY EXAM CHEST 2 VIEWS: CPT

## 2025-06-05 RX ORDER — PREDNISONE 20 MG/1
TABLET ORAL
Qty: 15 TABLET | Refills: 0 | Status: SHIPPED | OUTPATIENT
Start: 2025-06-05

## 2025-06-05 RX ORDER — ALBUTEROL SULFATE 90 UG/1
2 INHALANT RESPIRATORY (INHALATION) 4 TIMES DAILY PRN
Qty: 18 G | Refills: 0 | Status: SHIPPED | OUTPATIENT
Start: 2025-06-05

## 2025-06-05 ASSESSMENT — PAIN - FUNCTIONAL ASSESSMENT: PAIN_FUNCTIONAL_ASSESSMENT: NONE - DENIES PAIN

## 2025-06-05 NOTE — ED PROVIDER NOTES
[facility] EMERGENCY DEPARTMENT      EMERGENCY MEDICINE     Pt Name: August Nj  MRN: [id number]  Birthdate 2000  Date of evaluation: 6/5/2025  Provider: PERRY Sanchez    CHIEF COMPLAINT       Chief Complaint   Patient presents with    Pharyngitis     HISTORY OF PRESENT ILLNESS   August Nj is a pleasant 24 y.o. male who presents to the emergency department from from home, by private vehicle for evaluation of having a fever for the last 6 days.  The patient states that he has a cough and congestion.  He has had no vomiting diarrhea had a sore throat.        PASTMEDICAL HISTORY     Past Medical History:   Diagnosis Date    Diabetes mellitus (HCC)        There is no problem list on file for this patient.    SURGICAL HISTORY     No past surgical history on file.    CURRENT MEDICATIONS       Discharge Medication List as of 6/5/2025  1:57 PM        CONTINUE these medications which have NOT CHANGED    Details   ibuprofen (ADVIL;MOTRIN) 800 MG tablet Take 1 tablet by mouth every 8 hours as needed for Pain, Disp-30 tablet, R-0Normal             ALLERGIES     is allergic to amoxicillin.    FAMILY HISTORY     He indicated that his mother is alive. He indicated that his father is alive.       SOCIAL HISTORY       Social History     Tobacco Use    Smoking status: Never    Smokeless tobacco: Never   Vaping Use    Vaping status: Never Used   Substance Use Topics    Alcohol use: Yes     Comment: occas.    Drug use: Not Currently     Types: Marijuana (Weed)       PHYSICAL EXAM       ED Triage Vitals [06/05/25 1204]   BP Systolic BP Percentile Diastolic BP Percentile Temp Temp Source Pulse Respirations SpO2   127/70 -- -- 97.9 °F (36.6 °C) Oral 76 16 99 %      Height Weight - Scale         1.626 m (5' 4\") 63.5 kg (140 lb)             Additional Vital Signs:  Vitals:    06/05/25 1204   BP: 127/70   Pulse: 76   Resp: 16   Temp: 97.9 °F (36.6 °C)   SpO2: 99%     Physical Exam  Vitals and nursing note

## 2025-06-05 NOTE — ED TRIAGE NOTES
PT arrives to ED with c/o sore throat and cough since 5/30. Pt report increased mucus production.

## 2025-06-05 NOTE — DISCHARGE INSTR - COC
Continuity of Care Form    Patient Name: August Nj   :  2000  MRN:  [id number]    Admit date:  2025  Discharge date:  ***    Code Status Order: No Order   Advance Directives:     Admitting Physician:  No admitting provider for patient encounter.  PCP: No primary care provider on file.    Discharging Nurse: ***  Discharging Hospital Unit/Room#: B/B  Discharging Unit Phone Number: ***    Emergency Contact:   Extended Emergency Contact Information  Primary Emergency Contact: Massimo Darden   [facility]  Home Phone: [phone]  Relation: Parent    Past Surgical History:  No past surgical history on file.    Immunization History:   Immunization History   Administered Date(s) Administered    TDaP, ADACEL (age 10y-64y), BOOSTRIX (age 10y+), IM, 0.5mL 2025       Active Problems:  There is no problem list on file for this patient.      Isolation/Infection:   Isolation            No Isolation          Patient Infection Status    None to display              Nurse Assessment:  Last Vital Signs: /70   Pulse 76   Temp 97.9 °F (36.6 °C) (Oral)   Resp 16   Ht 1.626 m (5' 4\")   Wt 63.5 kg (140 lb)   SpO2 99%   BMI 24.03 kg/m²     Last documented pain score (0-10 scale):    Last Weight:   Wt Readings from Last 1 Encounters:   25 63.5 kg (140 lb)     Mental Status:  {IP PT MENTAL STATUS:02543}    IV Access:  { TONIE IV ACCESS:040225326}    Nursing Mobility/ADLs:  Walking   {CHP DME ADLs:956315415}  Transfer  {CHP DME ADLs:436656753}  Bathing  {CHP DME ADLs:932887297}  Dressing  {CHP DME ADLs:695180363}  Toileting  {CHP DME ADLs:728654395}  Feeding  {CHP DME ADLs:212119486}  Med Admin  {CHP DME ADLs:934646643}  Med Delivery   { TNOIE MED Delivery:510483099}    Wound Care Documentation and Therapy:        Elimination:  Continence:   Bowel: {YES / NO:}  Bladder: {YES / NO:}  Urinary Catheter: {Urinary Catheter:670602090}   Colostomy/Ileostomy/Ileal Conduit: {YES /  treatment of the diagnosis listed and that he requires {Admit to Appropriate Level of Care:00258} for {GREATER/LESS:127876186} 30 days.     Update Admission H&P: {CHP DME Changes in HandP:450056852}    PHYSICIAN SIGNATURE:  {Esignature:172551486}

## 2025-06-07 LAB — BACTERIA THROAT AEROBE CULT: NORMAL

## 2025-08-13 ENCOUNTER — HOSPITAL ENCOUNTER (EMERGENCY)
Age: 25
Discharge: HOME | End: 2025-08-13
Attending: STUDENT IN AN ORGANIZED HEALTH CARE EDUCATION/TRAINING PROGRAM
Payer: MEDICAID

## 2025-08-13 VITALS
RESPIRATION RATE: 22 BRPM | TEMPERATURE: 99.6 F | SYSTOLIC BLOOD PRESSURE: 131 MMHG | OXYGEN SATURATION: 99 % | DIASTOLIC BLOOD PRESSURE: 77 MMHG | WEIGHT: 140 LBS | HEART RATE: 67 BPM | HEIGHT: 65 IN | BODY MASS INDEX: 23.32 KG/M2

## 2025-08-13 DIAGNOSIS — T62.0X1A TOXIC EFFECT OF INGESTED MUSHROOM, UNINTENTIONAL, INITIAL ENCOUNTER: Primary | ICD-10-CM

## 2025-08-13 PROCEDURE — 99282 EMERGENCY DEPT VISIT SF MDM: CPT

## 2025-08-13 ASSESSMENT — PAIN SCALES - GENERAL: PAINLEVEL_OUTOF10: 0

## 2025-08-13 ASSESSMENT — PAIN - FUNCTIONAL ASSESSMENT: PAIN_FUNCTIONAL_ASSESSMENT: 0-10
